# Patient Record
(demographics unavailable — no encounter records)

---

## 2024-12-24 NOTE — PHYSICAL EXAM
[Respiratory Effort] : normal respiratory effort [Normal Heart Sounds] : normal heart sounds [2+] : left 2+ [Ankle Swelling (On Exam)] : present [Ankle Swelling Bilaterally] : bilaterally  [Ankle Swelling On The Right] : mild [Varicose Veins Of Lower Extremities] : bilaterally [Alert] : alert [Calm] : calm [Abdomen Tenderness] : ~T ~M No abdominal tenderness [de-identified] : WN/WD, NAD [de-identified] : NC/AT [de-identified] : supple [de-identified] : FROM 5/5x4 [de-identified] : LLE with bulging varicose veins over ankle [de-identified] : grossly intact

## 2024-12-24 NOTE — ASSESSMENT
[FreeTextEntry1] : 57-year-old F, Ivorian speaking presents for initial consultation due to lower extremity varicose veins. Patient has been having a long-standing history of varicosities but over the years her varicose veins became large, more pronounced, tender to touch.  On exam, both legs are well perfused, mild BL legs edema, LLE with bulging varicosities over the ankle. BL LE venous doppler was done in the office that demonstrated L GSV reflux and gross varicosities, no evidence of DVT/SVT bilaterally. We discussed the findings and recommended to continue wearing compression stockings. Also discussed L GSV RFA, RABs were explained, all questions answered. Patient will let us know if she would like to proceed. Educational material was provided.

## 2024-12-24 NOTE — HISTORY OF PRESENT ILLNESS
[FreeTextEntry1] : 57-year-old F, Australian speaking presents for initial consultation due to lower extremity varicose veins. Patient has been having a long-standing history of varicosities but over the years her varicose veins became large, more pronounced, tender to touch. She states that her varicose veins burn, itch and interfere with daily activities. Patient has been wearing compression stockings for about a year with minimal relief. Patient states that it became difficult for her to stay for prolonged period of time (> 1/2 hr). Patient occasionally takes over the counter pain medications to alleviate her symptoms. Patient denies hx of DVT/SVT, bleeding from her varicosities, claudication.

## 2024-12-24 NOTE — ASSESSMENT
[FreeTextEntry1] : 57-year-old F, Palestinian speaking presents for initial consultation due to lower extremity varicose veins. Patient has been having a long-standing history of varicosities but over the years her varicose veins became large, more pronounced, tender to touch.  On exam, both legs are well perfused, mild BL legs edema, LLE with bulging varicosities over the ankle. BL LE venous doppler was done in the office that demonstrated L GSV reflux and gross varicosities, no evidence of DVT/SVT bilaterally. We discussed the findings and recommended to continue wearing compression stockings. Also discussed L GSV RFA, RABs were explained, all questions answered. Patient will let us know if she would like to proceed. Educational material was provided.

## 2024-12-24 NOTE — ADDENDUM
[FreeTextEntry1] : This note was written by Joana FRIAS, acting as a scribe for Dr. Kasi Corbin.  I, Dr. Kasi Corbin, have read and attest that all the information, medical decision-making, and discharge instructions within are true and accurate.  I, Dr. Kasi Corbin, personally performed the evaluation and management (E/M) services for this new patient.  That E/M includes conducting the initial examination, assessing all conditions, and establishing the plan of care.  Today, my ACP, Joana FRIAS, was here to observe my evaluation and management services for this patient to be followed going forward.  I spent a total of 60 minutes in this encounter.

## 2024-12-24 NOTE — PHYSICAL EXAM
[Respiratory Effort] : normal respiratory effort [Normal Heart Sounds] : normal heart sounds [2+] : left 2+ [Ankle Swelling (On Exam)] : present [Ankle Swelling Bilaterally] : bilaterally  [Ankle Swelling On The Right] : mild [Varicose Veins Of Lower Extremities] : bilaterally [Alert] : alert [Calm] : calm [Abdomen Tenderness] : ~T ~M No abdominal tenderness [de-identified] : WN/WD, NAD [de-identified] : NC/AT [de-identified] : supple [de-identified] : FROM 5/5x4 [de-identified] : LLE with bulging varicose veins over ankle [de-identified] : grossly intact

## 2024-12-24 NOTE — HISTORY OF PRESENT ILLNESS
[FreeTextEntry1] : 57-year-old F, Mauritian speaking presents for initial consultation due to lower extremity varicose veins. Patient has been having a long-standing history of varicosities but over the years her varicose veins became large, more pronounced, tender to touch. She states that her varicose veins burn, itch and interfere with daily activities. Patient has been wearing compression stockings for about a year with minimal relief. Patient states that it became difficult for her to stay for prolonged period of time (> 1/2 hr). Patient occasionally takes over the counter pain medications to alleviate her symptoms. Patient denies hx of DVT/SVT, bleeding from her varicosities, claudication.

## 2024-12-24 NOTE — PROCEDURE
[FreeTextEntry1] : BL LE venous doppler was done in the office that demonstrated L GSV reflux and gross varicosities, no evidence of DVT/SVT bilaterally